# Patient Record
Sex: FEMALE | Race: WHITE | NOT HISPANIC OR LATINO | Employment: FULL TIME | URBAN - METROPOLITAN AREA
[De-identification: names, ages, dates, MRNs, and addresses within clinical notes are randomized per-mention and may not be internally consistent; named-entity substitution may affect disease eponyms.]

---

## 2019-02-17 ENCOUNTER — OFFICE VISIT (OUTPATIENT)
Dept: URGENT CARE | Facility: CLINIC | Age: 69
End: 2019-02-17
Payer: MEDICARE

## 2019-02-17 VITALS
HEIGHT: 66 IN | SYSTOLIC BLOOD PRESSURE: 122 MMHG | TEMPERATURE: 98 F | BODY MASS INDEX: 29.73 KG/M2 | DIASTOLIC BLOOD PRESSURE: 71 MMHG | WEIGHT: 185 LBS | HEART RATE: 66 BPM | OXYGEN SATURATION: 97 % | RESPIRATION RATE: 18 BRPM

## 2019-02-17 DIAGNOSIS — H65.03 BILATERAL ACUTE SEROUS OTITIS MEDIA, RECURRENCE NOT SPECIFIED: ICD-10-CM

## 2019-02-17 DIAGNOSIS — H81.01 LABYRINTHINE VERTIGO WITH INVOLVEMENT OF RIGHT INNER EAR: Primary | ICD-10-CM

## 2019-02-17 DIAGNOSIS — J01.10 ACUTE FRONTAL SINUSITIS, RECURRENCE NOT SPECIFIED: ICD-10-CM

## 2019-02-17 PROCEDURE — 99204 PR OFFICE/OUTPT VISIT, NEW, LEVL IV, 45-59 MIN: ICD-10-PCS | Mod: S$GLB,,, | Performed by: INTERNAL MEDICINE

## 2019-02-17 PROCEDURE — 99204 OFFICE O/P NEW MOD 45 MIN: CPT | Mod: S$GLB,,, | Performed by: INTERNAL MEDICINE

## 2019-02-17 RX ORDER — AMOXICILLIN AND CLAVULANATE POTASSIUM 875; 125 MG/1; MG/1
1 TABLET, FILM COATED ORAL 2 TIMES DAILY
Qty: 20 TABLET | Refills: 0 | Status: SHIPPED | OUTPATIENT
Start: 2019-02-17 | End: 2019-02-27

## 2019-02-17 RX ORDER — PREDNISONE 10 MG/1
10 TABLET ORAL DAILY
Qty: 5 TABLET | Refills: 0 | Status: SHIPPED | OUTPATIENT
Start: 2019-02-17 | End: 2019-02-22

## 2019-02-17 NOTE — PATIENT INSTRUCTIONS
Labyrinthitis    The inner ear is located behind the middle ear. It is part of the balance center of your body. When the inner ear becomes irritated or inflamed it causes a condition known as labyrinthitis. It may due to a viral infection, but often a cause is not found. Labyrinthitis causes sudden dizziness and balance problems. It often causes a feeling that you or the room is spinning (vertigo). You may feel nauseated or throw up. You may also feel a loss of balance when trying to walk. Head movement from side to side or changes in body position (from lying to sitting or standing) may worsen symptoms. You may have ringing in the ear. Hearing may also be affected.  An episode of labyrinthitis may last seconds, minutes or hours. It may never return. Or symptoms may recur off and on over several weeks or longer. In many cases, the problem is short-term and goes away when the inner ear issue resolves.  Home care  · Take medicine as prescribed to relieve your symptoms. Unless another medicine was prescribed, you can try over-the-counter motion sickness pills. Note that these medicines may cause drowsiness.  · If symptoms are severe, rest quietly in bed. Change positions slowly. There may be one position feels best, such as lying on one side or lying on your back with your head slightly raised on pillows.  · Vestibular rehabilitation exercises involve moving the head to help resolve problems in the inner ear. If these exercises have been prescribed, do them as you have been instructed.  · Do not drive or work with dangerous machinery until one week has passed without symptoms. Be cautious about using stairs.  Follow-up care  Follow up with your healthcare provider or as advised by our staff.  When to seek medical advice  Call your healthcare provider for any of the following:  · Symptoms that are not controlled by medicine or that get worse  · Repeated vomiting that is not relieved by medicine  · Increased weakness  or fainting  · Headache or unusual drowsiness  · Difficulty with vision or speech  · Trouble moving the face, arms or legs  · Hearing loss  · Symptoms persist beyond a few days  Date Last Reviewed: 4/26/2015  © 4579-1453 [email or web address]. [address] [place] [address], [place] [address]. All rights reserved. This information is not intended as a substitute for professional medical care. Always follow your healthcare professional's instructions.    Please return here or go to the Emergency Department for any concerns or worsening of condition.  If you were prescribed antibiotics, please take them to completion.  If you were prescribed a narcotic medication, do not drive or operate heavy equipment or machinery while taking these medications.  Please follow up with your primary care doctor or specialist as needed.    If you  smoke, please stop smoking.  1) Motrin/advil/ibuprofen- Take Two to Three Tablets(200 mg) three Times a Day for 5 to 7 Days.  2) Mucinex D 1/2 to 1 Tablet twice a day for 5 to 7 Days.  3) Drink Hot Liquids(coffee,WATER,Tea,Hot Chocolate,or Soup) that you put in a Mug place in Microwave for 2.5 to 3 minutes CHANGE THE CUP THAT WAS USED IN THE MICROWAVE SO AS NOT TO BURN YOUR MOUTH,then sniff the steam from the cup and sip the heated liquid TEN TO TWELVE TIMES A DAY for 5 to 7 Days.  4) These 3 things will help the antibiotics and other medications work faster and will speed your recovery!

## 2019-02-17 NOTE — PROGRESS NOTES
"Subjective:       Patient ID: Caty Dooley is a 68 y.o. female.    Vitals:  height is 5' 6" (1.676 m) and weight is 83.9 kg (185 lb). Her tympanic temperature is 98 °F (36.7 °C). Her blood pressure is 122/71 and her pulse is 66. Her respiration is 18 and oxygen saturation is 97%.     Chief Complaint: Dizziness    Dizziness:   Chronicity:  Recurrent  Onset:  Today  Progression since onset:  Gradually worsening  Frequency:  Constantly  Severity:  Moderate  Dizziness characteristics:  Off-balance   Associated symptoms: vomiting.no ear pain, no fever and no diaphoresis.  Treatments tried:  Nothing  Improvements on treatment:  No relief      Constitution: Negative for chills, sweating, fatigue and fever.   HENT: Negative for ear pain, congestion, sinus pain, sinus pressure, sore throat and voice change.    Neck: Negative for painful lymph nodes.   Eyes: Negative for eye redness.   Respiratory: Negative for chest tightness, cough, sputum production, bloody sputum, COPD, shortness of breath, stridor, wheezing and asthma.    Gastrointestinal: Positive for vomiting.   Musculoskeletal: Negative for muscle ache.   Skin: Negative for rash.   Allergic/Immunologic: Negative for seasonal allergies and asthma.   Neurological: Positive for dizziness and history of vertigo.   Hematologic/Lymphatic: Negative for swollen lymph nodes.       Objective:      Physical Exam   Constitutional: She is oriented to person, place, and time. She appears well-developed and well-nourished. She is cooperative.  Non-toxic appearance. She does not appear ill. No distress.   HENT:   Head: Normocephalic and atraumatic.   Right Ear: Hearing, external ear and ear canal normal. Tympanic membrane is injected and erythematous.   Left Ear: Hearing, external ear and ear canal normal. Tympanic membrane is injected and erythematous.   Nose: Mucosal edema and rhinorrhea present. No nasal deformity. No epistaxis. Right sinus exhibits frontal sinus tenderness. " Right sinus exhibits no maxillary sinus tenderness. Left sinus exhibits no maxillary sinus tenderness and no frontal sinus tenderness.   Mouth/Throat: Uvula is midline and mucous membranes are normal. No trismus in the jaw. Normal dentition. No uvula swelling. Posterior oropharyngeal erythema present.       Eyes: Conjunctivae and lids are normal. No scleral icterus.   Sclera clear bilat   Neck: Trachea normal, full passive range of motion without pain and phonation normal. Neck supple.   Cardiovascular: Normal rate, regular rhythm, normal heart sounds, intact distal pulses and normal pulses.   Pulmonary/Chest: Effort normal and breath sounds normal. No respiratory distress.   Abdominal: Soft. Normal appearance and bowel sounds are normal. She exhibits no distension. There is no tenderness.   Musculoskeletal: Normal range of motion. She exhibits no edema or deformity.   Neurological: She is alert and oriented to person, place, and time. She exhibits normal muscle tone. Coordination normal.   Skin: Skin is warm, dry and intact. She is not diaphoretic. No pallor.   Psychiatric: She has a normal mood and affect. Her speech is normal and behavior is normal. Judgment and thought content normal. Cognition and memory are normal.   Nursing note and vitals reviewed.      Assessment:       1. Labyrinthine vertigo with involvement of right inner ear    2. Acute frontal sinusitis, recurrence not specified    3. Bilateral acute serous otitis media, recurrence not specified        Plan:         Labyrinthine vertigo with involvement of right inner ear  -     amoxicillin-clavulanate 875-125mg (AUGMENTIN) 875-125 mg per tablet; Take 1 tablet by mouth 2 (two) times daily. for 10 days  Dispense: 20 tablet; Refill: 0  -     predniSONE (DELTASONE) 10 MG tablet; Take 1 tablet (10 mg total) by mouth once daily. for 5 doses  Dispense: 5 tablet; Refill: 0    Acute frontal sinusitis, recurrence not specified  -     amoxicillin-clavulanate  875-125mg (AUGMENTIN) 875-125 mg per tablet; Take 1 tablet by mouth 2 (two) times daily. for 10 days  Dispense: 20 tablet; Refill: 0  -     predniSONE (DELTASONE) 10 MG tablet; Take 1 tablet (10 mg total) by mouth once daily. for 5 doses  Dispense: 5 tablet; Refill: 0    Bilateral acute serous otitis media, recurrence not specified  -     amoxicillin-clavulanate 875-125mg (AUGMENTIN) 875-125 mg per tablet; Take 1 tablet by mouth 2 (two) times daily. for 10 days  Dispense: 20 tablet; Refill: 0  -     predniSONE (DELTASONE) 10 MG tablet; Take 1 tablet (10 mg total) by mouth once daily. for 5 doses  Dispense: 5 tablet; Refill: 0    take medsd